# Patient Record
Sex: MALE | Race: BLACK OR AFRICAN AMERICAN | NOT HISPANIC OR LATINO | URBAN - METROPOLITAN AREA
[De-identification: names, ages, dates, MRNs, and addresses within clinical notes are randomized per-mention and may not be internally consistent; named-entity substitution may affect disease eponyms.]

---

## 2024-03-25 ENCOUNTER — HOSPITAL ENCOUNTER (EMERGENCY)
Facility: HOSPITAL | Age: 34
Discharge: LEFT BEFORE DISCHARGE | End: 2024-03-25
Payer: MEDICARE

## 2024-03-25 VITALS
SYSTOLIC BLOOD PRESSURE: 122 MMHG | WEIGHT: 234 LBS | HEIGHT: 70 IN | BODY MASS INDEX: 33.5 KG/M2 | OXYGEN SATURATION: 97 % | RESPIRATION RATE: 18 BRPM | TEMPERATURE: 98.5 F | DIASTOLIC BLOOD PRESSURE: 74 MMHG | HEART RATE: 75 BPM

## 2024-03-25 PROCEDURE — 4500999001 HC ED NO CHARGE

## 2024-03-25 ASSESSMENT — COLUMBIA-SUICIDE SEVERITY RATING SCALE - C-SSRS
2. HAVE YOU ACTUALLY HAD ANY THOUGHTS OF KILLING YOURSELF?: NO
6. HAVE YOU EVER DONE ANYTHING, STARTED TO DO ANYTHING, OR PREPARED TO DO ANYTHING TO END YOUR LIFE?: NO
1. IN THE PAST MONTH, HAVE YOU WISHED YOU WERE DEAD OR WISHED YOU COULD GO TO SLEEP AND NOT WAKE UP?: NO

## 2024-03-26 ENCOUNTER — APPOINTMENT (OUTPATIENT)
Dept: RADIOLOGY | Facility: HOSPITAL | Age: 34
End: 2024-03-26
Payer: COMMERCIAL

## 2024-03-26 ENCOUNTER — HOSPITAL ENCOUNTER (EMERGENCY)
Facility: HOSPITAL | Age: 34
Discharge: HOME | End: 2024-03-26
Payer: COMMERCIAL

## 2024-03-26 VITALS
RESPIRATION RATE: 16 BRPM | OXYGEN SATURATION: 97 % | HEART RATE: 73 BPM | DIASTOLIC BLOOD PRESSURE: 72 MMHG | TEMPERATURE: 98.5 F | BODY MASS INDEX: 34.44 KG/M2 | WEIGHT: 240 LBS | SYSTOLIC BLOOD PRESSURE: 131 MMHG

## 2024-03-26 DIAGNOSIS — S93.402A SPRAIN OF LEFT ANKLE, INITIAL ENCOUNTER: Primary | ICD-10-CM

## 2024-03-26 DIAGNOSIS — M25.572 ACUTE LEFT ANKLE PAIN: ICD-10-CM

## 2024-03-26 DIAGNOSIS — S82.56XA: ICD-10-CM

## 2024-03-26 PROCEDURE — 99283 EMERGENCY DEPT VISIT LOW MDM: CPT

## 2024-03-26 PROCEDURE — 73610 X-RAY EXAM OF ANKLE: CPT | Mod: LT

## 2024-03-26 PROCEDURE — 99284 EMERGENCY DEPT VISIT MOD MDM: CPT

## 2024-03-26 PROCEDURE — 73610 X-RAY EXAM OF ANKLE: CPT | Mod: LEFT SIDE | Performed by: INTERNAL MEDICINE

## 2024-03-26 RX ORDER — ACETAMINOPHEN 325 MG/1
650 TABLET ORAL EVERY 6 HOURS PRN
Qty: 20 TABLET | Refills: 0 | Status: CANCELLED | OUTPATIENT
Start: 2024-03-26 | End: 2024-03-31

## 2024-03-26 RX ORDER — ACETAMINOPHEN 325 MG/1
650 TABLET ORAL ONCE
Status: COMPLETED | OUTPATIENT
Start: 2024-03-26 | End: 2024-03-26

## 2024-03-26 RX ORDER — IBUPROFEN 600 MG/1
600 TABLET ORAL EVERY 6 HOURS PRN
Qty: 16 TABLET | Refills: 0 | Status: CANCELLED | OUTPATIENT
Start: 2024-03-26 | End: 2024-03-30

## 2024-03-26 RX ADMIN — ACETAMINOPHEN 650 MG: 325 TABLET ORAL at 10:40

## 2024-03-26 ASSESSMENT — PAIN DESCRIPTION - ORIENTATION: ORIENTATION: LEFT;OUTER;INNER

## 2024-03-26 ASSESSMENT — PAIN SCALES - GENERAL: PAINLEVEL_OUTOF10: 7

## 2024-03-26 ASSESSMENT — PAIN DESCRIPTION - PAIN TYPE: TYPE: DEEP SOMATIC PAIN

## 2024-03-26 ASSESSMENT — COLUMBIA-SUICIDE SEVERITY RATING SCALE - C-SSRS
1. IN THE PAST MONTH, HAVE YOU WISHED YOU WERE DEAD OR WISHED YOU COULD GO TO SLEEP AND NOT WAKE UP?: NO
2. HAVE YOU ACTUALLY HAD ANY THOUGHTS OF KILLING YOURSELF?: NO
6. HAVE YOU EVER DONE ANYTHING, STARTED TO DO ANYTHING, OR PREPARED TO DO ANYTHING TO END YOUR LIFE?: NO

## 2024-03-26 ASSESSMENT — PAIN DESCRIPTION - LOCATION: LOCATION: ANKLE

## 2024-03-26 ASSESSMENT — PAIN - FUNCTIONAL ASSESSMENT: PAIN_FUNCTIONAL_ASSESSMENT: 0-10

## 2024-03-26 NOTE — Clinical Note
Jamshid Pimentel was seen and treated in our emergency department on 3/26/2024.  He may return to work on 03/30/2024.       If you have any questions or concerns, please don't hesitate to call.      Ace Bravo, APRN-CNP

## 2024-03-26 NOTE — ED TRIAGE NOTES
KETTY MARTINEZ is an otherwise healthy 33y old M with no pertinent PMHx is presenting to [facility] ED with a chief concern for L ankle sprain. Pt works at [employer] and was getting off work when he attempted to step off the stairwell and twisted his ankle. Denies falling. No deformity noted. Pt has strong DP to affected foot. Cap refill <2 secs. Pt is ambulatory with a steady gait. NKDA

## 2024-03-26 NOTE — ED TRIAGE NOTES
Medial and lateral L ankle pain since yesterday after stepping awkwardly off his work truck. Pt took motrin with mild relief.

## 2024-03-26 NOTE — ED PROVIDER NOTES
HPI   Chief Complaint   Patient presents with   • Ankle Pain       33-year-old male with no reported history presents for chief complaint of left ankle pain after awkwardly stepping off of his [employer] truck yesterday.  Denied fall and was caught by a customer.  Pain now in his left ankle at both the medial and lateral malleoli areas.  No swelling.  Ambulatory with steady gait but does limp.  Took Motrin prior to coming and states that this pain improved since then but still remains slightly.  Denies head, neck, back, chest, or abdominal pain.  No changes urination or bowel movements.  No nausea or vomiting.                          Chery Coma Scale Score: 15                     Patient History   No past medical history on file.  No past surgical history on file.  No family history on file.  Social History     Tobacco Use   • Smoking status: Not on file   • Smokeless tobacco: Not on file   Substance Use Topics   • Alcohol use: Not on file   • Drug use: Not on file       Physical Exam   ED Triage Vitals [03/26/24 0914]   Temperature Heart Rate Respirations BP   36.9 °C (98.5 °F) 73 16 131/72      Pulse Ox Temp Source Heart Rate Source Patient Position   97 % Temporal -- Sitting      BP Location FiO2 (%)     Right arm --       Physical Exam  Constitutional:       Appearance: Normal appearance.   HENT:      Head: Normocephalic and atraumatic.      Mouth/Throat:      Mouth: Mucous membranes are moist.      Pharynx: Oropharynx is clear.   Eyes:      Extraocular Movements: Extraocular movements intact.      Conjunctiva/sclera: Conjunctivae normal.      Pupils: Pupils are equal, round, and reactive to light.   Cardiovascular:      Rate and Rhythm: Normal rate and regular rhythm.      Pulses: Normal pulses.      Heart sounds: Normal heart sounds.   Pulmonary:      Effort: Pulmonary effort is normal.      Breath sounds: Normal breath sounds.   Abdominal:      General: Abdomen is flat.      Palpations: Abdomen is soft.    Musculoskeletal:         General: Normal range of motion.      Cervical back: Normal range of motion and neck supple.      Comments: Mild tenderness to bilateral left malleoli without crepitus or deformity.  MSPs intact.  Ambulatory with steady gait but with slight limp.   Skin:     General: Skin is warm and dry.      Capillary Refill: Capillary refill takes less than 2 seconds.   Neurological:      General: No focal deficit present.      Mental Status: He is alert and oriented to person, place, and time.   Psychiatric:         Mood and Affect: Mood normal.         Behavior: Behavior normal.         Thought Content: Thought content normal.         Judgment: Judgment normal.         ED Course & MDM   Diagnoses as of 03/26/24 1141   Sprain of left ankle, initial encounter   Acute left ankle pain   Nondisplaced fracture of medial malleolus of unspecified tibia, initial encounter for closed fracture       Medical Decision Making  Vital signs reviewed, unremarkable at this time.  Patient is well-appearing and in no apparent distress.  Speaks full sentences without difficulty.  Diagnostic testing performed.  Given Tylenol for pain control.  Given Aircast but declined crutches.  X-ray of the ankle showed an age-indeterminate small avulsion fracture arising off of the tip of the medial malleolus.  Advised to get some rest and stay off of it and to follow-up with Ortho as well as primary care. .  Advised to get some rest we discussed the RICE therapy method.  Advised to follow-up with primary care and to return with any new or worsening symptoms.  Work note given.  Encouraged to take Tylenol Motrin as needed for pain control.  Patient in agreement this plan.  Discharged stable condition.        Procedure  Procedures     Ace Bravo, ANGEL-CNP  03/26/24 1141

## 2024-04-01 ENCOUNTER — OFFICE VISIT (OUTPATIENT)
Dept: PRIMARY CARE | Facility: HOSPITAL | Age: 34
End: 2024-04-01
Payer: COMMERCIAL

## 2024-04-01 VITALS
HEIGHT: 70 IN | OXYGEN SATURATION: 98 % | DIASTOLIC BLOOD PRESSURE: 91 MMHG | TEMPERATURE: 98.6 F | SYSTOLIC BLOOD PRESSURE: 128 MMHG | HEART RATE: 97 BPM | BODY MASS INDEX: 36.79 KG/M2 | WEIGHT: 257 LBS

## 2024-04-01 DIAGNOSIS — S82.892D CLOSED AVULSION FRACTURE OF LEFT ANKLE WITH ROUTINE HEALING, SUBSEQUENT ENCOUNTER: ICD-10-CM

## 2024-04-01 DIAGNOSIS — Z00.00 HEALTHCARE MAINTENANCE: Primary | ICD-10-CM

## 2024-04-01 LAB
ALBUMIN SERPL BCP-MCNC: 4 G/DL (ref 3.4–5)
ANION GAP SERPL CALC-SCNC: 15 MMOL/L (ref 10–20)
BUN SERPL-MCNC: 14 MG/DL (ref 6–23)
CALCIUM SERPL-MCNC: 9.1 MG/DL (ref 8.6–10.6)
CHLORIDE SERPL-SCNC: 106 MMOL/L (ref 98–107)
CHOLEST SERPL-MCNC: 169 MG/DL (ref 0–199)
CHOLESTEROL/HDL RATIO: 3.7
CO2 SERPL-SCNC: 23 MMOL/L (ref 21–32)
CREAT SERPL-MCNC: 1.11 MG/DL (ref 0.5–1.3)
EGFRCR SERPLBLD CKD-EPI 2021: 90 ML/MIN/1.73M*2
ERYTHROCYTE [DISTWIDTH] IN BLOOD BY AUTOMATED COUNT: 14.4 % (ref 11.5–14.5)
EST. AVERAGE GLUCOSE BLD GHB EST-MCNC: 108 MG/DL
GLUCOSE SERPL-MCNC: 72 MG/DL (ref 74–99)
HBA1C MFR BLD: 5.4 %
HCT VFR BLD AUTO: 44.9 % (ref 41–52)
HDLC SERPL-MCNC: 45.2 MG/DL
HGB BLD-MCNC: 14.5 G/DL (ref 13.5–17.5)
LDLC SERPL CALC-MCNC: 111 MG/DL
MCH RBC QN AUTO: 28.5 PG (ref 26–34)
MCHC RBC AUTO-ENTMCNC: 32.3 G/DL (ref 32–36)
MCV RBC AUTO: 88 FL (ref 80–100)
NON HDL CHOLESTEROL: 124 MG/DL (ref 0–149)
NRBC BLD-RTO: 0 /100 WBCS (ref 0–0)
PHOSPHATE SERPL-MCNC: 3.1 MG/DL (ref 2.5–4.9)
PLATELET # BLD AUTO: 199 X10*3/UL (ref 150–450)
POTASSIUM SERPL-SCNC: 5 MMOL/L (ref 3.5–5.3)
RBC # BLD AUTO: 5.08 X10*6/UL (ref 4.5–5.9)
SODIUM SERPL-SCNC: 139 MMOL/L (ref 136–145)
TREPONEMA PALLIDUM IGG+IGM AB [PRESENCE] IN SERUM OR PLASMA BY IMMUNOASSAY: NONREACTIVE
TRIGL SERPL-MCNC: 66 MG/DL (ref 0–149)
VLDL: 13 MG/DL (ref 0–40)
WBC # BLD AUTO: 8.5 X10*3/UL (ref 4.4–11.3)

## 2024-04-01 PROCEDURE — 36415 COLL VENOUS BLD VENIPUNCTURE: CPT

## 2024-04-01 PROCEDURE — 80069 RENAL FUNCTION PANEL: CPT

## 2024-04-01 PROCEDURE — 83036 HEMOGLOBIN GLYCOSYLATED A1C: CPT

## 2024-04-01 PROCEDURE — 85027 COMPLETE CBC AUTOMATED: CPT

## 2024-04-01 PROCEDURE — 99385 PREV VISIT NEW AGE 18-39: CPT | Mod: GC

## 2024-04-01 PROCEDURE — 99385 PREV VISIT NEW AGE 18-39: CPT

## 2024-04-01 PROCEDURE — 80061 LIPID PANEL: CPT

## 2024-04-01 PROCEDURE — 86780 TREPONEMA PALLIDUM: CPT

## 2024-04-01 ASSESSMENT — ENCOUNTER SYMPTOMS
LOSS OF SENSATION IN FEET: 0
DEPRESSION: 0
OCCASIONAL FEELINGS OF UNSTEADINESS: 0

## 2024-04-01 ASSESSMENT — PAIN SCALES - GENERAL: PAINLEVEL: 2

## 2024-04-01 ASSESSMENT — PATIENT HEALTH QUESTIONNAIRE - PHQ9
2. FEELING DOWN, DEPRESSED OR HOPELESS: NOT AT ALL
SUM OF ALL RESPONSES TO PHQ9 QUESTIONS 1 AND 2: 0
1. LITTLE INTEREST OR PLEASURE IN DOING THINGS: NOT AT ALL

## 2024-04-01 NOTE — LETTER
April 1, 2024     Patient: Jamshid Pimentel   YOB: 1990   Date of Visit: 4/1/2024       To Whom It May Concern:    Jamshid Pimentel was seen in my clinic on 4/1/2024 at 1:00 pm. Please excuse Jamshid for his absence from work on this day to make the appointment. He has a left ankle fracture and would need to be off work for a week. Please, he would return to work on 4/8/2024 with light duty.     If you have any questions or concerns, please don't hesitate to call [phone].     Sincerely,     Sher Pat MD

## 2024-04-01 NOTE — PATIENT INSTRUCTIONS
Dear  Margarito,    Thank you for your visit today. We talked about your left ankle which would require an orthopedic referral. You will also get basic labs today to establish care and will see us in 6 months for follow-up. Please do not hesitate to call us back if you have any issues.     Erick,  [facility]

## 2024-04-01 NOTE — PROGRESS NOTES
Chief complaint:    HPI:  Jamshid Pimentel is a 33 y.o. male with no significant past medical history who presented today  to establish PCP.    Of note patient rolled his left ankle about a week ago, went to the ED where an x-ray showed left medial malleoli avulsion fracture of indeterminate age and he was given an ortho ankle brace and told to see his PCP and follow-up with orthopedics.    Today, he denies fever, chest pain, shortness of breath, nausea/vomiting /diarrhea, abdominal pain or skin changes.  Denies denies alcohol use or smoking marijuana or illicit drugs but attests to using black and milds per week.  Uses protection during sex and is monogamous.    Medications:  No current outpatient medications on file.    Allergies:  No Known Allergies    Past medical history:  No past medical history on file.    Surgical history:  No past surgical history on file.    Family history:  No family history on file.    Social history:   reports that he has been smoking cigarettes. He has never used smokeless tobacco. He reports current alcohol use. He reports that he does not use drugs.    Health maintenance:  Health Maintenance   Topic Date Due    Yearly Adult Physical  Never done    Hepatitis B Vaccines (1 of 3 - 3-dose series) Never done    Lipid Panel  Never done    COVID-19 Vaccine (1) Never done    MMR Vaccines (1 of 1 - Standard series) Never done    Varicella Vaccines (1 of 2 - 2-dose childhood series) Never done    Hepatitis C Screening  Never done    Influenza Vaccine (Season Ended) 09/01/2024    DTaP/Tdap/Td Vaccines (3 - Td or Tdap) 08/29/2029    Zoster Vaccines (1 of 2) 08/10/2041    HIV Screening  Completed    HIB Vaccines  Aged Out    IPV Vaccines  Aged Out    Hepatitis A Vaccines  Aged Out    Meningococcal Vaccine  Aged Out    Rotavirus Vaccines  Aged Out    HPV Vaccines  Aged Out    Pneumococcal Vaccine: Pediatrics (0 to 5 Years) and At-Risk Patients (6 to 64 Years)  Aged Out       Review of  "systems:  Review of Systems   12 point review of systems noncontributory    Vitals:  Vitals:    04/01/24 1323   BP: (!) 128/91   Pulse: 97   Temp: 37 °C (98.6 °F)   SpO2: 98%       Physical exam:  Physical Exam  General: No obvious distress  HEENT: No JVD  Chest: Clear  CVS: S1-S2 within normal limits  Abdomen: Nontender  MSK: Left ankle orthopedic brace in situ, right leg within normal limits  Neuro: AAOx4    Labs:  Lab Results   Component Value Date    WBC 8.7 03/20/2020    HGB 14.7 03/20/2020    HCT 46.4 03/20/2020    MCV 89 03/20/2020     03/20/2020       Lab Results   Component Value Date    GLUCOSE 84 03/20/2020    CALCIUM 9.1 03/20/2020     03/20/2020    K 4.4 03/20/2020    CO2 22 03/20/2020     (H) 03/20/2020    BUN 10 03/20/2020    CREATININE 0.98 03/20/2020       No results found for: \"HGBA1C\"     No results found for: \"CHOL\"  No results found for: \"HDL\"  No results found for: \"LDLCALC\"  No results found for: \"TRIG\"  No components found for: \"CHOLHDL\"    Imaging:  XR ankle left 3+ views    Result Date: 3/26/2024  Interpreted By:  Violeta Hwang and [place] Conner STUDY: XR ANKLE LEFT 3+ VIEWS; ;  3/26/2024 10:51 am   INDICATION: Signs/Symptoms:L medial and lateral malleolar pain after stepping out of truck yesterday.   COMPARISON: Left tibia 03/10/2020   ACCESSION NUMBER(S): [id number]   ORDERING CLINICIAN: EMILIA GRADY   FINDINGS: There is a tiny ossific fragment is noted immediately distal to the tip of the medial malleolus. There are also well corticated round osseous bodies adjacent to the medial surface of the medial malleolus, may represent a remote avulsion deformity. Soft tissue swelling about the ankle. No ankle joint effusion. The ankle mortise maintained. No significant degenerative changes.       Age-indeterminate avulsion fracture arising off of the tip of the medial malleolus, correlation with history and clinical exam findings may be of value to further assess " the acuity of this fracture. Soft tissue swelling is noted about the ankle.   I personally reviewed the image(s)/study and resident interpretation. I agree with the findings as stated by resident Stan Brooks. Data analyzed and images interpreted at [facility], [place], [place].   MACRO: None   Signed by: Violeta Hwang 3/26/2024 11:27 AM Dictation workstation:   [id number]      Assessment and plan:  Jamshid Pimentel is a 33 y.o. male Jamshid Pimentel is a 33 y.o. male with no significant past medical history who presented today  to establish PCP. Of note patient rolled his left ankle about a week ago, went to the ED where an x-ray showed left medial malleoli avulsion fracture of indeterminate age and he was given an ortho ankle brace and told to see his PCP and follow-up with orthopedics.    #PCP establishment  #Left medial malleoli avulsion fracture  -ortho referral insitu  -Cbc/rfp/lipids/Hba1c/syphilis/Hep c screening gotten today    #Elevated Blood pressure  :::128/91  -Diet and exercise counseled    #Health maintenance  -Cbc/rfp/lipids/Hba1c/syphilis/Hep c screening gotten today  -Diet and exercise counseled    RTC in 6months    Patient and plan discussed with attending physician MD [facility]

## 2024-04-02 ENCOUNTER — OFFICE VISIT (OUTPATIENT)
Dept: ORTHOPEDIC SURGERY | Facility: HOSPITAL | Age: 34
End: 2024-04-02
Payer: COMMERCIAL

## 2024-04-02 ENCOUNTER — HOSPITAL ENCOUNTER (OUTPATIENT)
Dept: RADIOLOGY | Facility: HOSPITAL | Age: 34
Discharge: HOME | End: 2024-04-02
Payer: COMMERCIAL

## 2024-04-02 VITALS — WEIGHT: 243 LBS | BODY MASS INDEX: 34.79 KG/M2 | HEIGHT: 70 IN

## 2024-04-02 DIAGNOSIS — S93.402A SPRAIN OF LEFT ANKLE, UNSPECIFIED LIGAMENT, INITIAL ENCOUNTER: ICD-10-CM

## 2024-04-02 DIAGNOSIS — S93.402A SPRAIN OF LEFT ANKLE, UNSPECIFIED LIGAMENT, INITIAL ENCOUNTER: Primary | ICD-10-CM

## 2024-04-02 PROCEDURE — 73610 X-RAY EXAM OF ANKLE: CPT | Mod: LEFT SIDE | Performed by: STUDENT IN AN ORGANIZED HEALTH CARE EDUCATION/TRAINING PROGRAM

## 2024-04-02 PROCEDURE — 99213 OFFICE O/P EST LOW 20 MIN: CPT

## 2024-04-02 PROCEDURE — 73610 X-RAY EXAM OF ANKLE: CPT | Mod: LT

## 2024-04-02 PROCEDURE — 99203 OFFICE O/P NEW LOW 30 MIN: CPT

## 2024-04-02 ASSESSMENT — PAIN SCALES - GENERAL: PAINLEVEL_OUTOF10: 2

## 2024-04-02 ASSESSMENT — PAIN DESCRIPTION - DESCRIPTORS: DESCRIPTORS: ACHING

## 2024-04-02 ASSESSMENT — PAIN - FUNCTIONAL ASSESSMENT: PAIN_FUNCTIONAL_ASSESSMENT: 0-10

## 2024-04-02 NOTE — PROGRESS NOTES
Subjective    Patient ID: Jamshid Pimentel is a 33 y.o. male.    Chief Complaint: Ankle Pain of the Left Ankle (Sprained ankle last Tuesday at work.)     HPI    Patient is a 32 y/o male who presents for evaluation of left ankle pain. Patient states that he is an [employer]  and he was stepping off of his truck when he rolled his ankle. This occurred on 3/26/2024. He states when the injury occurred that there was immediate sharp pain and swelling at the ankle. Patient went to the ER where he was treated for an ankle sprain and given a walking boot. The patient rates this pain a 2/10 on the pain scale and states the symptoms are better with rest and worse with prolonged activities including walking, standing, stair climbing.  The patient states that the pain has improved since the injury and that it is worse when not wearing his brace and after being on it for long periods of time. Denies locking or catching, denies fever/chills, N/T or calf pain.    No past medical history on file.    Medication Documentation Review Audit       Reviewed by Teena Grant MA (Medical Assistant) on 04/02/24 at 0912      Medication Order Taking? Sig Documenting Provider Last Dose Status            No Medications to Display                                   No Known Allergies    Social History     Socioeconomic History    Marital status: Single     Spouse name: Not on file    Number of children: Not on file    Years of education: Not on file    Highest education level: Not on file   Occupational History    Not on file   Tobacco Use    Smoking status: Every Day     Types: Cigars    Smokeless tobacco: Never   Substance and Sexual Activity    Alcohol use: Yes     Comment: occasionally    Drug use: Never    Sexual activity: Not on file   Other Topics Concern    Not on file   Social History Narrative    Not on file     Social Determinants of Health     Financial Resource Strain: Not on file   Food Insecurity: Not on file   Transportation  Needs: Not on file   Physical Activity: Not on file   Stress: Not on file   Social Connections: Not on file   Intimate Partner Violence: Not on file   Housing Stability: Not on file       No past surgical history on file.     Review of Systems   30 point ROS reviewed and negative other than as listed in the HPI.      Objective      Exam  Gen: The pt is A&Ox3, NAD, and appear state age and weight  Psychiatric: mood and affect are appropriate   Eyes: sclera are white, EOM grossly intact  ENT: MMM  Neck: supple, thyroid is midline  Respiratory: respirations are nonlabored, chest rise symmetric  CV: rate is regular by palpation of distal pulses  Abdomen: nondistended   Integument: no obvious cutaneous lesions noted. No signs of lymphangitis. No signs of systemic edema.   MSK:    side: left lower extremity : ankle    Skin healthy and intact  Swelling noted   Tenderness: mild  Intact flexion and extension of digits  Neurovascular exam normal distally  2+ dorsalis pedis pulse and good cap refill     Imaging:  I personally reviewed multiple views of the left ankle were obtained in the office today demonstrate an old medial malleolus avulsion fracture and soft tissue swelling over the medial malleolus. Joint line maintained.      Assessment/Plan   Encounter Diagnoses:  Sprain of left ankle, unspecified ligament, initial encounter    Plan:  We discussed conservative treatment of ankle sprain with RICE, activity modification, lace up ankle brace, and therapy. Discussed that imaging shows an old medial malleolus avulsion fracture that will heal on its own.     Patient was already given an air cast at the Emergency Department and will continue to wear the air cast when at work and weight bearing on the left ankle.      Verbal and written instructions for the use, wear schedule, cleaning and application of this item were given. Patient was instructed that should the brace result in increased pain, decreased sensation, increased  swelling, or an overall worsening of their medical condition, to please contact our office immediately.     Patient will elevate, ice, and take over the counter anti-inflammatories to help with pain and swelling.     Patient will follow up with our office as needed.     Natural history reviewed. All pt questions/concerns addressed and they are in agreement with the plan.      Orders Placed This Encounter    XR ankle left 3+ views     No follow-ups on file.

## 2024-04-04 PROBLEM — S82.53XA CLOSED FRACTURE OF MEDIAL MALLEOLUS: Status: ACTIVE | Noted: 2024-03-26

## 2024-04-22 ENCOUNTER — TELEPHONE (OUTPATIENT)
Dept: PRIMARY CARE | Facility: HOSPITAL | Age: 34
End: 2024-04-22
Payer: COMMERCIAL

## 2024-04-22 ENCOUNTER — DOCUMENTATION (OUTPATIENT)
Dept: INTERNAL MEDICINE | Facility: HOSPITAL | Age: 34
End: 2024-04-22
Payer: COMMERCIAL

## 2024-04-22 NOTE — TELEPHONE ENCOUNTER
Patient was seen on 4/1/24 for ankle pain and given a RTW letter for light duty.    Patient is requesting a letter releasing him to Full Duty. Patient requesting letter be available to him on my chart so he can print and give to his employer.    Patient can be reached at [phone]

## 2024-12-18 ENCOUNTER — HOSPITAL ENCOUNTER (EMERGENCY)
Facility: HOSPITAL | Age: 34
Discharge: LEFT BEFORE DISCHARGE | End: 2024-12-18
Payer: MEDICARE

## 2024-12-18 ENCOUNTER — APPOINTMENT (OUTPATIENT)
Dept: RADIOLOGY | Facility: HOSPITAL | Age: 34
End: 2024-12-18
Payer: MEDICARE

## 2024-12-18 VITALS
TEMPERATURE: 97.7 F | HEIGHT: 70 IN | BODY MASS INDEX: 34.36 KG/M2 | OXYGEN SATURATION: 99 % | SYSTOLIC BLOOD PRESSURE: 138 MMHG | DIASTOLIC BLOOD PRESSURE: 83 MMHG | RESPIRATION RATE: 16 BRPM | HEART RATE: 97 BPM | WEIGHT: 240 LBS

## 2024-12-18 DIAGNOSIS — S92.101A CLOSED NONDISPLACED FRACTURE OF RIGHT TALUS, UNSPECIFIED PORTION OF TALUS, INITIAL ENCOUNTER: Primary | ICD-10-CM

## 2024-12-18 PROCEDURE — 2500000001 HC RX 250 WO HCPCS SELF ADMINISTERED DRUGS (ALT 637 FOR MEDICARE OP): Performed by: PHYSICIAN ASSISTANT

## 2024-12-18 PROCEDURE — 73630 X-RAY EXAM OF FOOT: CPT | Mod: RIGHT SIDE | Performed by: RADIOLOGY

## 2024-12-18 PROCEDURE — 73630 X-RAY EXAM OF FOOT: CPT | Mod: RT

## 2024-12-18 PROCEDURE — 99284 EMERGENCY DEPT VISIT MOD MDM: CPT

## 2024-12-18 PROCEDURE — 73610 X-RAY EXAM OF ANKLE: CPT | Mod: RT

## 2024-12-18 PROCEDURE — 73610 X-RAY EXAM OF ANKLE: CPT | Mod: RIGHT SIDE | Performed by: RADIOLOGY

## 2024-12-18 RX ORDER — IBUPROFEN 600 MG/1
600 TABLET ORAL ONCE
Status: COMPLETED | OUTPATIENT
Start: 2024-12-18 | End: 2024-12-18

## 2024-12-18 RX ORDER — ACETAMINOPHEN 325 MG/1
975 TABLET ORAL ONCE
Status: COMPLETED | OUTPATIENT
Start: 2024-12-18 | End: 2024-12-18

## 2024-12-18 ASSESSMENT — PAIN SCALES - GENERAL: PAINLEVEL_OUTOF10: 7

## 2024-12-18 ASSESSMENT — PAIN DESCRIPTION - LOCATION: LOCATION: ANKLE

## 2024-12-18 ASSESSMENT — PAIN DESCRIPTION - ORIENTATION: ORIENTATION: RIGHT

## 2024-12-18 ASSESSMENT — PAIN - FUNCTIONAL ASSESSMENT: PAIN_FUNCTIONAL_ASSESSMENT: 0-10

## 2024-12-18 NOTE — ED PROVIDER NOTES
Emergency Department Provider Note        History of Present Illness     History provided by: Patient  Limitations to History: None  External Records Reviewed with Brief Summary:  previous ankle fx in April of L ankle    HPI:  Jamshid Pimentel is a 34 y.o. 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  Patient states that he was able to walk after the incident but then yesterday rolled it again on the uneven curb.  Patient states he did miss work today because of still painful but he has been able to ambulate is just painful to do so.  Denies falling to the ground hitting his head losing consciousness or sustaining any other injuries.  Patient has been using an Aircast which he had from the previous ankle injury.  Has not taken anything for pain.  Is agreeable for Tylenol and ibuprofen here.    Physical Exam   Triage vitals:  T 36.5 °C (97.7 °F)  HR 97  /83  RR 16  O2 99 % None (Room air)    Physical Exam  Vitals and nursing note reviewed.   Constitutional:       General: He is not in acute distress.     Appearance: Normal appearance. He is not toxic-appearing.   HENT:      Head: Normocephalic and atraumatic.      Nose: Nose normal.   Eyes:      Extraocular Movements: Extraocular movements intact.   Cardiovascular:      Rate and Rhythm: Normal rate and regular rhythm.   Pulmonary:      Effort: Pulmonary effort is normal.   Abdominal:      Palpations: Abdomen is soft.   Musculoskeletal:         General: Normal range of motion.      Cervical back: Normal range of motion and neck supple.      Right ankle: No swelling, deformity, ecchymosis or lacerations. Tenderness present over the ATF ligament. Normal range of motion.      Right Achilles Tendon: Normal.       Right foot: Normal capillary refill. Tenderness present. No swelling, deformity, bunion, foot drop, laceration or crepitus. Normal pulse.        Feet:       Comments: No proximal tib-fib tenderness or tenderness along the entirety of the lower leg aside from the foot and ankle.   Skin:     General: Skin is warm and dry.   Neurological:      General: No focal deficit present.      Mental Status: He is alert.   Psychiatric:         Mood and Affect: Mood normal.         Thought Content: Thought content normal.        XR ankle right 3+ views   Final Result   Tiny chip or avulsion type fracture from the anterior and dorsal   surface of the talus just distal to the talar dome.   This is best appreciated on the lateral view of the ankle.   Signed by Reji Queen MD      XR foot right 3+ views   Final Result   Tiny chip or avulsion type fracture from the anterior and dorsal   surface of the talus just distal to the talar dome.   This is best appreciated on the lateral view of the ankle.   Signed by Reji Queen MD        ED Course as of 12/18/24 1921   Wed Dec 18, 2024   1623 Spoke with [place] [facility] at this time, he reviewed imaging, stated that this could be treated like an ankle sprain with a walking boot and weightbearing as tolerated, does not even need ortho follow-up. [MK]   1633 Unable to find patient in lobby to notify of results or ask his shoe size for orthotics to provide walking boot [MK]   1713 Checked again for patient, he is unable to be found in the lobby [MK]   1713 Attempted to call patient and went straight to voicemail. []      ED Course User Index  [MK] Sandra Nuñez PA-C         Diagnoses as of 24   Closed nondisplaced fracture of right talus, unspecified portion of talus, initial encounter           Medical Decision Making & ED Course   Medical Decision Makin y.o. male presents today for evaluation of an inversion injury to the right ankle, this is an inversion  injury, he is tender over the anterior talofibular ligament, likely sprain however given the lateral malleoli are tenderness I did x-ray his foot and ankle, there is no tib-fib tenderness, I did order him Tylenol and ibuprofen here.  Patient was ambulatory throughout the department today.  Patient's x-ray unfortunately did show a tiny avulsion fracture of the talus just distal to the talar dome.  I spoke with orthopedics regarding this and they reviewed x-ray imaging, stated that patient can be placed in a walking boot and can be weightbearing as tolerated, no indication for orthopedic follow-up.  I attempted to call orthotics to arrange for a walking boot however when I went to find the patient to ask in his shoe size he was nowhere to be found, myself and nurse tried on several different occasions to find him in the lobby as well as to call his phone number with no answer.  Unfortunately patient left with treatment incomplete prior to me being able to go over his results or provide him with a walking boot.  ----      Differential diagnoses considered include but are not limited to: Strain, sprain, fracture, contusion     Social Determinants of Health which Significantly Impact Care: None identified     EKG Independent Interpretation: EKG not obtained    Independent Result Review and Interpretation: Relevant laboratory and radiographic results were reviewed and independently interpreted by myself.  As necessary, they are commented on in the ED Course.    Chronic conditions affecting the patient's care: As documented above in [facility]    The patient was discussed with the following consultants/services:  orthopedics    Care Considerations: As documented above in [facility]    ED Course:  ED Course as of 12/18/24 1921   Wed Dec 18, 2024   1623 Spoke with [facility] at this time, he reviewed imaging, stated that this could be treated like an ankle sprain with a walking boot and weightbearing as tolerated, does not even  need ortho follow-up. [MK]   1633 Unable to find patient in lobby to notify of results or ask his shoe size for orthotics to provide walking boot [MK]   1713 Checked again for patient, he is unable to be found in the lobby [MK]   1713 Attempted to call patient and went straight to voicemail. [MK]      ED Course User Index  [MK] Sandra Nuñez PA-C         Diagnoses as of 12/18/24 1921   Closed nondisplaced fracture of right talus, unspecified portion of talus, initial encounter     Disposition   Left with Treatment Incomplete    Procedures   Procedures    Patient was seen independently    Sandra Nuñez PA-C  Emergency Medicine       Sandra Nuñez PA-C  12/18/24 1921

## 2024-12-18 NOTE — ED TRIAGE NOTES
Patient is having R ankle pain that started x1 week ago, patient sipped in the snow and twisted ankle. Patient is ambulatory Patient states that he has been attempting to rest and ice it but is still having swelling and pain. States he was at work and it twisted again and made injury worse.